# Patient Record
Sex: MALE | Race: WHITE | Employment: UNEMPLOYED | URBAN - METROPOLITAN AREA
[De-identification: names, ages, dates, MRNs, and addresses within clinical notes are randomized per-mention and may not be internally consistent; named-entity substitution may affect disease eponyms.]

---

## 2017-01-18 PROBLEM — M54.50 BACK PAIN AT L4-L5 LEVEL: Status: ACTIVE | Noted: 2017-01-18

## 2017-07-27 PROBLEM — Z98.890 PREVIOUS BACK SURGERY: Status: ACTIVE | Noted: 2017-07-27

## 2017-08-17 PROBLEM — M25.562 LEFT KNEE PAIN: Status: ACTIVE | Noted: 2017-08-17

## 2017-10-06 ENCOUNTER — HOSPITAL ENCOUNTER (OUTPATIENT)
Dept: ULTRASOUND IMAGING | Age: 27
Discharge: HOME | End: 2017-10-06
Payer: MEDICARE

## 2017-10-06 DIAGNOSIS — N50.89 LUMP IN THE TESTICLE: ICD-10-CM

## 2017-10-06 PROCEDURE — 76870 US EXAM SCROTUM: CPT

## 2017-10-09 ENCOUNTER — HOSPITAL ENCOUNTER (OUTPATIENT)
Age: 27
Setting detail: OUTPATIENT SURGERY
Discharge: HOME | End: 2017-10-09
Attending: SURGERY | Admitting: SURGERY
Payer: MEDICARE

## 2017-10-09 VITALS
TEMPERATURE: 98.2 F | HEART RATE: 56 BPM | WEIGHT: 185 LBS | BODY MASS INDEX: 25.9 KG/M2 | RESPIRATION RATE: 16 BRPM | OXYGEN SATURATION: 96 % | SYSTOLIC BLOOD PRESSURE: 106 MMHG | DIASTOLIC BLOOD PRESSURE: 65 MMHG | HEIGHT: 71 IN

## 2017-10-09 PROCEDURE — 99153 MOD SED SAME PHYS/QHP EA: CPT | Performed by: SURGERY

## 2017-10-09 PROCEDURE — 3609010600 HC COLONOSCOPY POLYPECTOMY SNARE/COLD BIOPSY: Performed by: SURGERY

## 2017-10-09 PROCEDURE — 6360000002 HC RX W HCPCS: Performed by: SURGERY

## 2017-10-09 PROCEDURE — 7100000010 HC PHASE II RECOVERY - FIRST 15 MIN: Performed by: SURGERY

## 2017-10-09 PROCEDURE — 88305 TISSUE EXAM BY PATHOLOGIST: CPT

## 2017-10-09 PROCEDURE — 2500000003 HC RX 250 WO HCPCS: Performed by: SURGERY

## 2017-10-09 PROCEDURE — 99152 MOD SED SAME PHYS/QHP 5/>YRS: CPT | Performed by: SURGERY

## 2017-10-09 PROCEDURE — 2580000003 HC RX 258: Performed by: SURGERY

## 2017-10-09 PROCEDURE — 7100000011 HC PHASE II RECOVERY - ADDTL 15 MIN: Performed by: SURGERY

## 2017-10-09 RX ORDER — MIDAZOLAM HYDROCHLORIDE 1 MG/ML
INJECTION INTRAMUSCULAR; INTRAVENOUS PRN
Status: DISCONTINUED | OUTPATIENT
Start: 2017-10-09 | End: 2017-10-09 | Stop reason: HOSPADM

## 2017-10-09 RX ORDER — FENTANYL CITRATE 50 UG/ML
INJECTION, SOLUTION INTRAMUSCULAR; INTRAVENOUS PRN
Status: DISCONTINUED | OUTPATIENT
Start: 2017-10-09 | End: 2017-10-09 | Stop reason: HOSPADM

## 2017-10-09 RX ORDER — SODIUM CHLORIDE, SODIUM LACTATE, POTASSIUM CHLORIDE, CALCIUM CHLORIDE 600; 310; 30; 20 MG/100ML; MG/100ML; MG/100ML; MG/100ML
INJECTION, SOLUTION INTRAVENOUS CONTINUOUS
Status: DISCONTINUED | OUTPATIENT
Start: 2017-10-09 | End: 2017-10-09 | Stop reason: HOSPADM

## 2017-10-09 RX ADMIN — SODIUM CHLORIDE, POTASSIUM CHLORIDE, SODIUM LACTATE AND CALCIUM CHLORIDE: 600; 310; 30; 20 INJECTION, SOLUTION INTRAVENOUS at 09:52

## 2017-10-09 ASSESSMENT — PAIN SCALES - GENERAL: PAINLEVEL_OUTOF10: 0

## 2017-10-09 ASSESSMENT — PAIN - FUNCTIONAL ASSESSMENT: PAIN_FUNCTIONAL_ASSESSMENT: 0-10

## 2017-10-09 NOTE — OP NOTE
Preoperative diagnosis: Rectal bleeding  Postoperative diagnosis: Cecal polyp otherwise grossly unremarkable colonoscopy barring somewhat suboptimal prep  Procedure: Total colonoscopy to cecum with intubation of terminal ileum and cecal polypectomy  Surgeon: Dr. Zuhair Ricci  Asst.: None  Anesthesia: IV sedation 200 µg of fentanyl and 6 mg of Versed and 1 amp of glucagon  Preparation: Mechanical bowel prep  EBL: None  Specimen: Cecal polyp  Procedure: 23-year-old male with history of rectal bleeding was scheduled for a colonoscopy. Informed consent was obtained. Patient was taken to the endoscopy suite. Intravenous sedation was given. Vital signs were monitored and remained stable. Timeout was done. Rectal exam revealed no large masses or bleeding. Olympus colonoscopy was advanced through the rectum to the level of cecum. Terminal ileum was intubated. No ileitis or colitis seen. There was some spasm noted and patient was given glucagon. Patient has a very low pain tolerance and he required significant amount of sedation as mentioned above. Vital signs were again stable. Scope was withdrawn inspecting the entire colon. There was a small polyp noted in the cecum that was removed using cold biopsy forceps. No colitis no diverticulosis seen. No obstruction seen. The prep was somewhat suboptimal.  The entire colon was inspected. Scope was withdrawn back into the rectum. Scope was retroflexed. No other pathology was seen. Scope was straightened and removed. Patient tolerated procedure well and was transferred to the recovery room in a stable condition. Findings are discussed with the family.   Recommendations: Outpatient follow-up in the office to discuss pathology results. If the pathology is benign then symptomatic treatment. High fiber diet. Annual guaiac checks.

## 2017-10-09 NOTE — H&P
HISTORY and [facility]       NAME:  Sukhi Mcqueen  MRN: [id number]   YOB: 1990   Date: 10/9/2017   Age: 31 y.o. Gender: male       COMPLAINT AND PRESENT HISTORY:     Sukhi Mcqueen is 31 y.o.,   male, having a  Colonoscopy d/t rectal bleeding. Patient denies any other GI symptoms. No nausea / vomiting, No diarrhea or constipation. No abdominal pains or cramping. No heartburn, no changes in the color, caliber or consistency of the stools. No fever or chills. PAST MEDICAL HISTORY     Past Medical History:   Diagnosis Date    Blood in stool     Chronic back pain     Seasonal allergies          SURGICAL HISTORY       Past Surgical History:   Procedure Laterality Date    BACK SURGERY      BACK SURGERY  04/26/2017         SOCIAL HISTORY       Social History     Social History    Marital status: Single     Spouse name: N/A    Number of children: N/A    Years of education: N/A     Social History Main Topics    Smoking status: Former Smoker     Packs/day: 0.50     Types: Cigarettes    Smokeless tobacco: Never Used    Alcohol use No    Drug use: No    Sexual activity: Not Asked     Other Topics Concern    None     Social History Narrative    None           REVIEW OF SYSTEMS      No Known Allergies    No current facility-administered medications on file prior to encounter. Current Outpatient Prescriptions on File Prior to Encounter   Medication Sig Dispense Refill    oxyCODONE-acetaminophen (PERCOCET)  MG per tablet Take 1 tablet by mouth 2 times daily . Earliest Fill Date: 9/21/17 60 tablet 0       Negative except for what is mentioned in the HPI. GENERAL PHYSICAL EXAM     Vitals: /69   Pulse 62   Temp 98.4 °F (36.9 °C) (Oral)   Resp 16   Ht 5' 11\" (1.803 m)   Wt 185 lb (83.9 kg)   SpO2 100%   BMI 25.80 kg/m²  Body mass index is 25.8 kg/m².      GENERAL APPEARANCE:   Sukhi Mcqueen is 31 y.o.,  male, not obese, nourished, conscious, alert. Does not appear to be distress or pain at this time. SKIN:  Warm, dry, no cyanosis or jaundice. HEAD:  Normocephalic, atraumatic, no swelling or tenderness. EYES:  Pupils equal, reactive to light. EARS:  No discharge, no marked hearing loss. NOSE:  No rhinorrhea, epistaxis or septal deformity. THROAT:  Not congested. No ulceration bleeding or discharge. NECK:  No stiffness, trachea central.  No palpable masses or L.N.                 CHEST:  Symmetrical and equal on expansion. HEART:  RRR S1 > S2. No audible murmurs or gallops. LUNGS:  Equal on expansion, normal breath sounds. No adventitious sounds. ABDOMEN:    Soft on palpation. No localized tenderness. No guarding or rigidity. No palpable organomegaly. LYMPHATICS:  No palpable cervical lymphadenopathy. LOCOMOTOR, BACK AND SPINE:  No tenderness or deformities. EXTREMITIES:  Symmetrical, no pedal edema. Lateshas sign negative. No discoloration or ulcerations. NEUROLOGIC:  The patient is conscious, alert, oriented, No apparent focal sensory or motor deficits.                       PROVISIONAL DIAGNOSES / SURGERY:    Rectal bleeding  colonoscopy  Patient Active Problem List    Diagnosis Date Noted    Left knee pain 08/17/2017    Previous back surgery 07/27/2017    Back pain at L4-L5 level 01/18/2017    Cough with sputum 12/13/2016    Hypertension 07/12/2013           Fer Fajardo PA-C on 10/9/2017 at 9:48 AM

## 2017-10-10 LAB — SURGICAL PATHOLOGY REPORT: NORMAL

## 2017-11-27 ENCOUNTER — HOSPITAL ENCOUNTER (EMERGENCY)
Age: 27
Discharge: HOME | End: 2017-11-27
Attending: EMERGENCY MEDICINE
Payer: MEDICARE

## 2017-11-27 VITALS
BODY MASS INDEX: 26.6 KG/M2 | HEART RATE: 88 BPM | TEMPERATURE: 98.2 F | OXYGEN SATURATION: 100 % | RESPIRATION RATE: 16 BRPM | WEIGHT: 190 LBS | DIASTOLIC BLOOD PRESSURE: 88 MMHG | SYSTOLIC BLOOD PRESSURE: 156 MMHG | HEIGHT: 71 IN

## 2017-11-27 DIAGNOSIS — N39.0 URINARY TRACT INFECTION WITHOUT HEMATURIA, SITE UNSPECIFIED: Primary | ICD-10-CM

## 2017-11-27 DIAGNOSIS — M54.2 NECK PAIN: ICD-10-CM

## 2017-11-27 DIAGNOSIS — F41.1 ANXIETY STATE: ICD-10-CM

## 2017-11-27 LAB
-: ABNORMAL
AMORPHOUS: ABNORMAL
ANION GAP SERPL CALCULATED.3IONS-SCNC: 14 MMOL/L (ref 9–17)
BACTERIA: ABNORMAL
BILIRUBIN URINE: ABNORMAL
BUN BLDV-MCNC: 14 MG/DL (ref 6–20)
BUN/CREAT BLD: ABNORMAL (ref 9–20)
CALCIUM SERPL-MCNC: 9.7 MG/DL (ref 8.6–10.4)
CASTS UA: ABNORMAL /LPF
CHLORIDE BLD-SCNC: 100 MMOL/L (ref 98–107)
CO2: 27 MMOL/L (ref 20–31)
COLOR: ABNORMAL
COMMENT UA: ABNORMAL
CREAT SERPL-MCNC: 0.96 MG/DL (ref 0.7–1.2)
CRYSTALS, UA: ABNORMAL /HPF
EPITHELIAL CELLS UA: ABNORMAL /HPF
GFR AFRICAN AMERICAN: >60 ML/MIN
GFR NON-AFRICAN AMERICAN: >60 ML/MIN
GFR SERPL CREATININE-BSD FRML MDRD: ABNORMAL ML/MIN/{1.73_M2}
GFR SERPL CREATININE-BSD FRML MDRD: ABNORMAL ML/MIN/{1.73_M2}
GLUCOSE BLD-MCNC: 106 MG/DL (ref 70–99)
GLUCOSE URINE: NEGATIVE
KETONES, URINE: ABNORMAL
LEUKOCYTE ESTERASE, URINE: ABNORMAL
MUCUS: ABNORMAL
NITRITE, URINE: NEGATIVE
OTHER OBSERVATIONS UA: ABNORMAL
PH UA: 6 (ref 5–8)
POTASSIUM SERPL-SCNC: 3.7 MMOL/L (ref 3.7–5.3)
PROTEIN UA: NEGATIVE
RBC UA: ABNORMAL /HPF
RENAL EPITHELIAL, UA: ABNORMAL /HPF
SODIUM BLD-SCNC: 141 MMOL/L (ref 135–144)
SPECIFIC GRAVITY UA: 1.02 (ref 1–1.03)
TRICHOMONAS: ABNORMAL
TSH SERPL DL<=0.05 MIU/L-ACNC: 2.15 MIU/L (ref 0.3–5)
TURBIDITY: CLEAR
URINE HGB: NEGATIVE
UROBILINOGEN, URINE: NORMAL
WBC UA: ABNORMAL /HPF
YEAST: ABNORMAL

## 2017-11-27 PROCEDURE — 6370000000 HC RX 637 (ALT 250 FOR IP): Performed by: EMERGENCY MEDICINE

## 2017-11-27 PROCEDURE — 99283 EMERGENCY DEPT VISIT LOW MDM: CPT

## 2017-11-27 PROCEDURE — 87591 N.GONORRHOEAE DNA AMP PROB: CPT

## 2017-11-27 PROCEDURE — 87086 URINE CULTURE/COLONY COUNT: CPT

## 2017-11-27 PROCEDURE — 6360000002 HC RX W HCPCS: Performed by: EMERGENCY MEDICINE

## 2017-11-27 PROCEDURE — 87491 CHLMYD TRACH DNA AMP PROBE: CPT

## 2017-11-27 PROCEDURE — 81001 URINALYSIS AUTO W/SCOPE: CPT

## 2017-11-27 PROCEDURE — 80048 BASIC METABOLIC PNL TOTAL CA: CPT

## 2017-11-27 PROCEDURE — 36415 COLL VENOUS BLD VENIPUNCTURE: CPT

## 2017-11-27 PROCEDURE — 96372 THER/PROPH/DIAG INJ SC/IM: CPT

## 2017-11-27 PROCEDURE — 84443 ASSAY THYROID STIM HORMONE: CPT

## 2017-11-27 RX ORDER — CEFTRIAXONE 500 MG/1
250 INJECTION, POWDER, FOR SOLUTION INTRAMUSCULAR; INTRAVENOUS ONCE
Status: COMPLETED | OUTPATIENT
Start: 2017-11-27 | End: 2017-11-27

## 2017-11-27 RX ORDER — LORAZEPAM 1 MG/1
1 TABLET ORAL ONCE
Status: COMPLETED | OUTPATIENT
Start: 2017-11-27 | End: 2017-11-27

## 2017-11-27 RX ORDER — AZITHROMYCIN 250 MG/1
1000 TABLET, FILM COATED ORAL ONCE
Status: COMPLETED | OUTPATIENT
Start: 2017-11-27 | End: 2017-11-27

## 2017-11-27 RX ADMIN — LORAZEPAM 1 MG: 1 TABLET ORAL at 17:04

## 2017-11-27 RX ADMIN — AZITHROMYCIN 1000 MG: 250 TABLET, FILM COATED ORAL at 18:43

## 2017-11-27 RX ADMIN — CEFTRIAXONE SODIUM 250 MG: 500 INJECTION, POWDER, FOR SOLUTION INTRAMUSCULAR; INTRAVENOUS at 18:43

## 2017-11-27 ASSESSMENT — ENCOUNTER SYMPTOMS
ABDOMINAL PAIN: 0
SORE THROAT: 0
COUGH: 0
VOMITING: 0
NAUSEA: 0
SHORTNESS OF BREATH: 0

## 2017-11-27 ASSESSMENT — PAIN SCALES - GENERAL: PAINLEVEL_OUTOF10: 6

## 2017-11-27 NOTE — ED PROVIDER NOTES
Number of children: N/A    Years of education: N/A     Occupational History    Not on file. Social History Main Topics    Smoking status: Former Smoker     Packs/day: 0.50     Types: Cigarettes    Smokeless tobacco: Never Used    Alcohol use Yes      Comment: occasionally    Drug use:      Types: Marijuana      Comment: cocaine used last 4 years    Sexual activity: Not on file     Other Topics Concern    Not on file     Social History Narrative    No narrative on file       History reviewed. No pertinent family history. Allergies:  Review of patient's allergies indicates no known allergies. Home Medications:  Prior to Admission medications    Medication Sig Start Date End Date Taking? Authorizing Provider   cephALEXin (KEFLEX) 500 MG capsule Take 1 capsule by mouth 3 times daily 11/13/17  Yes Jessica Engle MD   oxyCODONE-acetaminophen (PERCOCET)  MG per tablet Take 1 tablet by mouth 2 times daily . Earliest Fill Date: 11/13/17 11/13/17 12/13/17 Yes Jessica Engle MD       REVIEW OF SYSTEMS    (2-9 systems for level 4, 10 or more for level 5)      Review of Systems   Constitutional: Negative for chills and fever. HENT: Negative for sore throat. Eyes: Negative for visual disturbance. Respiratory: Negative for cough and shortness of breath. Cardiovascular: Negative for chest pain. Gastrointestinal: Negative for abdominal pain, nausea and vomiting. Genitourinary: Positive for frequency. Negative for difficulty urinating, discharge, dysuria, penile pain, penile swelling and testicular pain. Musculoskeletal: Positive for neck pain. Negative for arthralgias and myalgias. Skin: Negative for wound. Neurological: Negative for weakness, numbness and headaches. Psychiatric/Behavioral: Negative for behavioral problems. The patient is nervous/anxious.         PHYSICAL EXAM   (up to 7 for level 4, 8 or more for level 5)      INITIAL VITALS:   BP (!) 156/88   Pulse 88   Temp 98.2 °F (36.8 °C) (Oral)   Resp 16   Ht 5' 11\" (1.803 m)   Wt 190 lb (86.2 kg)   SpO2 100%   BMI 26.50 kg/m²     Physical Exam   Constitutional: He is oriented to person, place, and time. He appears well-developed and well-nourished. No distress. HENT:   Head: Normocephalic and atraumatic. Mouth/Throat: Oropharynx is clear and moist.   Eyes: EOM are normal. Pupils are equal, round, and reactive to light. Neck: Normal range of motion. Paraspinal tenderness to palpation of cervical spine; no step-offs or deformities felt; full range of motion without difficulty   Cardiovascular: Normal rate and regular rhythm. Pulmonary/Chest: Effort normal. He has no wheezes. He has no rales. Abdominal: Soft. There is no tenderness. There is no rebound and no guarding. Genitourinary: Penis normal. Right testis shows no swelling and no tenderness. Left testis shows swelling. Left testis shows no tenderness. No penile tenderness. No discharge found. Musculoskeletal: Normal range of motion. No gross motor or sensory deficit; has 5 out of 5 strength to his upper and lower extremities; able to ambulate without ataxia or difficulty   Neurological: He is alert and oriented to person, place, and time. He has normal strength. No cranial nerve deficit or sensory deficit. Coordination and gait normal. GCS eye subscore is 4. GCS verbal subscore is 5. GCS motor subscore is 6. Skin: Skin is warm and dry.    Psychiatric: His behavior is normal.       DIFFERENTIAL  DIAGNOSIS     PLAN (LABS / IMAGING / EKG):  Orders Placed This Encounter   Procedures    Urine Culture    C.trachomatis N.gonorrhoeae DNA, Urine    Urinalysis with Microscopic    Basic Metabolic Panel    TSH WITH REFLEX    ICTOTEST, URINE       MEDICATIONS ORDERED:  Orders Placed This Encounter   Medications    LORazepam (ATIVAN) tablet 1 mg    cefTRIAXone (ROCEPHIN) injection 250 mg    azithromycin (ZITHROMAX) tablet 1,000 mg       DIAGNOSTIC RESULTS / EMERGENCY deformities. Has full range of motion of his neck. In regards to his chronic neck pain, there is low suspicion for any nerve impingement as the patient denies any complaints of weakness, numbness or tingling. As such, there is no need for further imaging studies to be performed on his neck. In regards to his anxiety, we discussed that he needs to follow-up with a psychiatrist for reevaluation of his medical therapy. Discussed that we wouldn't discharge him with any new anxiolytics. In regards to his change in color of urine, we'll plan to obtain a urinalysis and a BMP to assess for renal function. RADIOLOGY:  None    EKG  None    All EKG's are interpreted by the Emergency Department Physician who either signs or Co-signs this chart in the absence of a cardiologist.    EMERGENCY DEPARTMENT COURSE:  Patient was updated on lab results. Discussed that his urine showed signs of infection. As such, he was offered a azithromycin and ceftriaxone. He was advised to follow-up with his primary care doctor for his chronic issues that he presented with today. Patient repeatedly asked for analgesics for his neck pain back pain, but was told that he would not be given any more narcotics. Discussed that if symptoms persist or do not improve, he should return to the ED. Patient demonstrated his understanding is agreeable to plan. Patient is stable for discharge. PROCEDURES:  None    CONSULTS:  None    CRITICAL CARE:  None    FINAL IMPRESSION      1. Urinary tract infection without hematuria, site unspecified    2. Neck pain    3.  Anxiety state          DISPOSITION / PLAN     DISPOSITION Decision to Discharge    PATIENT REFERRED TO:  Frankey Mas, [address] [place] [address]     Schedule an appointment as soon as possible for a visit   As needed, If symptoms worsen      DISCHARGE MEDICATIONS:  Discharge Medication List as of 11/27/2017  6:23 PM          Dwayne Molina MD  Emergency Medicine Resident    (Please note that portions of this note were completed with a voice recognition program.  Efforts were made to edit the dictations but occasionally words are mis-transcribed.)       Jeison Burt MD  Resident  11/27/17 [id number]

## 2017-11-27 NOTE — ED PROVIDER NOTES
patient in conjunction with the APC and agree with the assessment, treatment plan, and disposition of the patient as recorded by the APC. Additional findings are as noted.     Trung Contreras MD  Attending Emergency  Physician              Lissa Masters MD  11/27/17 [id number]

## 2017-11-28 LAB
C. TRACHOMATIS DNA ,URINE: NEGATIVE
CULTURE: NO GROWTH
CULTURE: NORMAL
Lab: NORMAL
N. GONORRHOEAE DNA, URINE: NEGATIVE
SPECIMEN DESCRIPTION: NORMAL
SPECIMEN DESCRIPTION: NORMAL
STATUS: NORMAL

## (undated) DEVICE — DEFENDO AIR WATER SUCTION AND BIOPSY VALVE KIT FOR  OLYMPUS: Brand: DEFENDO AIR/WATER/SUCTION AND BIOPSY VALVE

## (undated) DEVICE — JELLY,LUBE,STERILE,FLIP TOP,TUBE,2-OZ: Brand: MEDLINE

## (undated) DEVICE — AIRLIFE™ NASAL OXYGEN CANNULA CURVED, FLARED TIP, WITH 7 FEET (2.1 M) CRUSH RESISTANT TUBING, OVER-THE-EAR STYLE: Brand: AIRLIFE™

## (undated) DEVICE — FORCEPS BX L240CM WRK CHN 2.8MM STD CAP W/ NDL MIC MESH